# Patient Record
Sex: MALE | Race: BLACK OR AFRICAN AMERICAN | NOT HISPANIC OR LATINO | Employment: FULL TIME | URBAN - METROPOLITAN AREA
[De-identification: names, ages, dates, MRNs, and addresses within clinical notes are randomized per-mention and may not be internally consistent; named-entity substitution may affect disease eponyms.]

---

## 2021-11-24 ENCOUNTER — OFFICE VISIT (OUTPATIENT)
Dept: URGENT CARE | Facility: CLINIC | Age: 31
End: 2021-11-24
Payer: COMMERCIAL

## 2021-11-24 VITALS
RESPIRATION RATE: 18 BRPM | WEIGHT: 200 LBS | HEART RATE: 84 BPM | OXYGEN SATURATION: 98 % | SYSTOLIC BLOOD PRESSURE: 131 MMHG | TEMPERATURE: 98 F | HEIGHT: 69 IN | BODY MASS INDEX: 29.62 KG/M2 | DIASTOLIC BLOOD PRESSURE: 73 MMHG

## 2021-11-24 DIAGNOSIS — H01.001 BLEPHARITIS OF RIGHT UPPER EYELID, UNSPECIFIED TYPE: Primary | ICD-10-CM

## 2021-11-24 PROCEDURE — 99203 PR OFFICE/OUTPT VISIT, NEW, LEVL III, 30-44 MIN: ICD-10-PCS | Mod: S$GLB,,, | Performed by: NURSE PRACTITIONER

## 2021-11-24 PROCEDURE — 99203 OFFICE O/P NEW LOW 30 MIN: CPT | Mod: S$GLB,,, | Performed by: NURSE PRACTITIONER

## 2021-11-24 RX ORDER — POLYMYXIN B SULFATE AND TRIMETHOPRIM 1; 10000 MG/ML; [USP'U]/ML
1 SOLUTION OPHTHALMIC 4 TIMES DAILY
Qty: 1.8667 ML | Refills: 0 | Status: SHIPPED | OUTPATIENT
Start: 2021-11-24 | End: 2021-12-01

## 2021-11-27 ENCOUNTER — TELEPHONE (OUTPATIENT)
Dept: URGENT CARE | Facility: CLINIC | Age: 31
End: 2021-11-27
Payer: COMMERCIAL

## 2022-02-13 ENCOUNTER — OFFICE VISIT (OUTPATIENT)
Dept: URGENT CARE | Facility: CLINIC | Age: 32
End: 2022-02-13
Payer: COMMERCIAL

## 2022-02-13 VITALS
HEART RATE: 78 BPM | OXYGEN SATURATION: 98 % | RESPIRATION RATE: 16 BRPM | WEIGHT: 200 LBS | DIASTOLIC BLOOD PRESSURE: 74 MMHG | SYSTOLIC BLOOD PRESSURE: 136 MMHG | TEMPERATURE: 98 F | HEIGHT: 69 IN | BODY MASS INDEX: 29.62 KG/M2

## 2022-02-13 DIAGNOSIS — H00.011 HORDEOLUM EXTERNUM OF RIGHT UPPER EYELID: ICD-10-CM

## 2022-02-13 DIAGNOSIS — H01.004 BLEPHARITIS OF LEFT UPPER EYELID, UNSPECIFIED TYPE: Primary | ICD-10-CM

## 2022-02-13 PROCEDURE — 3075F SYST BP GE 130 - 139MM HG: CPT | Mod: CPTII,S$GLB,, | Performed by: PHYSICIAN ASSISTANT

## 2022-02-13 PROCEDURE — 3078F DIAST BP <80 MM HG: CPT | Mod: CPTII,S$GLB,, | Performed by: PHYSICIAN ASSISTANT

## 2022-02-13 PROCEDURE — 1159F MED LIST DOCD IN RCRD: CPT | Mod: CPTII,S$GLB,, | Performed by: PHYSICIAN ASSISTANT

## 2022-02-13 PROCEDURE — 3008F PR BODY MASS INDEX (BMI) DOCUMENTED: ICD-10-PCS | Mod: CPTII,S$GLB,, | Performed by: PHYSICIAN ASSISTANT

## 2022-02-13 PROCEDURE — 1160F RVW MEDS BY RX/DR IN RCRD: CPT | Mod: CPTII,S$GLB,, | Performed by: PHYSICIAN ASSISTANT

## 2022-02-13 PROCEDURE — 3078F PR MOST RECENT DIASTOLIC BLOOD PRESSURE < 80 MM HG: ICD-10-PCS | Mod: CPTII,S$GLB,, | Performed by: PHYSICIAN ASSISTANT

## 2022-02-13 PROCEDURE — 1159F PR MEDICATION LIST DOCUMENTED IN MEDICAL RECORD: ICD-10-PCS | Mod: CPTII,S$GLB,, | Performed by: PHYSICIAN ASSISTANT

## 2022-02-13 PROCEDURE — 99213 PR OFFICE/OUTPT VISIT, EST, LEVL III, 20-29 MIN: ICD-10-PCS | Mod: S$GLB,,, | Performed by: PHYSICIAN ASSISTANT

## 2022-02-13 PROCEDURE — 1160F PR REVIEW ALL MEDS BY PRESCRIBER/CLIN PHARMACIST DOCUMENTED: ICD-10-PCS | Mod: CPTII,S$GLB,, | Performed by: PHYSICIAN ASSISTANT

## 2022-02-13 PROCEDURE — 3075F PR MOST RECENT SYSTOLIC BLOOD PRESS GE 130-139MM HG: ICD-10-PCS | Mod: CPTII,S$GLB,, | Performed by: PHYSICIAN ASSISTANT

## 2022-02-13 PROCEDURE — 99213 OFFICE O/P EST LOW 20 MIN: CPT | Mod: S$GLB,,, | Performed by: PHYSICIAN ASSISTANT

## 2022-02-13 PROCEDURE — 3008F BODY MASS INDEX DOCD: CPT | Mod: CPTII,S$GLB,, | Performed by: PHYSICIAN ASSISTANT

## 2022-02-13 RX ORDER — POLYMYXIN B SULFATE AND TRIMETHOPRIM 1; 10000 MG/ML; [USP'U]/ML
1 SOLUTION OPHTHALMIC EVERY 6 HOURS
Qty: 10 ML | Refills: 0 | Status: SHIPPED | OUTPATIENT
Start: 2022-02-13

## 2022-02-13 RX ORDER — SULFAMETHOXAZOLE AND TRIMETHOPRIM 800; 160 MG/1; MG/1
1 TABLET ORAL 2 TIMES DAILY
Qty: 10 TABLET | Refills: 0 | Status: SHIPPED | OUTPATIENT
Start: 2022-02-13 | End: 2022-02-18

## 2022-02-13 NOTE — PROGRESS NOTES
"Subjective:       Patient ID: Abundio Horton is a 31 y.o. male.    Vitals:  height is 5' 9" (1.753 m) and weight is 90.7 kg (200 lb). His temperature is 97.9 °F (36.6 °C). His blood pressure is 136/74 and his pulse is 78. His respiration is 16 and oxygen saturation is 98%.     Chief Complaint: Eye Problem    Patient works in grain industry. Tends to get a lot of yvonne particles in his eyes.    Eye Problem   The left eye is affected. This is a new problem. The current episode started yesterday. The problem occurs constantly. The problem has been gradually worsening. The injury mechanism was a foreign body. The pain is at a severity of 6/10. The pain is moderate. There is no known exposure to pink eye. He does not wear contacts. Associated symptoms include blurred vision, an eye discharge (green, crusty discharge), eye redness and photophobia. Pertinent negatives include no double vision, fever, foreign body sensation, itching, nausea, recent URI or vomiting. Associated symptoms comments: Eye feels sore. He has tried eye drops and water (Baby shampoo (per Derm recommendation), heat compress) for the symptoms. The treatment provided mild relief.       Constitution: Negative for fever.   Eyes: Positive for eye discharge (green, crusty discharge), eye redness, photophobia and blurred vision. Negative for eye itching and double vision.   Gastrointestinal: Negative for nausea and vomiting.       Objective:      Physical Exam   Constitutional: He is oriented to person, place, and time. He appears well-developed and well-nourished.   HENT:   Head: Normocephalic and atraumatic.   Ears:   Right Ear: External ear normal.   Left Ear: External ear normal.   Nose: Nose normal.   Mouth/Throat: Oropharynx is clear and moist.   Eyes: Conjunctivae, EOM and lids are normal. Pupils are equal, round, and reactive to light. Lids are everted and swept, no foreign bodies found. Right eye exhibits hordeolum (Healing but not resolved).      " extraocular movement intact vision grossly intact      Comments: +left upper lid with significant swelling and erythema.  +tender to palpation.     Neck: Trachea normal and phonation normal. Neck supple.   Musculoskeletal: Normal range of motion.         General: Normal range of motion.   Neurological: He is alert and oriented to person, place, and time.   Skin: Skin is warm, dry and intact.   Psychiatric: He has a normal mood and affect. His speech is normal and behavior is normal. Judgment and thought content normal. Cognition and memory  Nursing note and vitals reviewed.        Assessment:       1. Blepharitis of left upper eyelid, unspecified type    2. Hordeolum externum of right upper eyelid          Plan:         Blepharitis of left upper eyelid, unspecified type  -     sulfamethoxazole-trimethoprim 800-160mg (BACTRIM DS) 800-160 mg Tab; Take 1 tablet by mouth 2 (two) times daily. for 5 days  Dispense: 10 tablet; Refill: 0  -     polymyxin B sulf-trimethoprim (POLYTRIM) 10,000 unit- 1 mg/mL Drop; Place 1 drop into the left eye every 6 (six) hours.  Dispense: 10 mL; Refill: 0    Hordeolum externum of right upper eyelid  -     sulfamethoxazole-trimethoprim 800-160mg (BACTRIM DS) 800-160 mg Tab; Take 1 tablet by mouth 2 (two) times daily. for 5 days  Dispense: 10 tablet; Refill: 0  -     polymyxin B sulf-trimethoprim (POLYTRIM) 10,000 unit- 1 mg/mL Drop; Place 1 drop into the left eye every 6 (six) hours.  Dispense: 10 mL; Refill: 0       Bactrim sent to pharmacy due to the tenderness of left eyelid and the presence of right stye still there.    Patient states last visit he got the Polytrim and it helped with pain relief.  Will send a prescription of Polytrim to pharmacy.    Warm compresses to eyelid 2-3 times per day.  Tylenol and/or Ibuprofen for pain.  Follow up with Ophthalmology for further treatment.  ER with new or worsening symptoms.

## 2022-02-13 NOTE — PATIENT INSTRUCTIONS
Patient Education       Blepharitis   The Basics   Written by the doctors and editors at [facility]   What is blepharitis? -- Blepharitis is inflammation of the eyelids that causes redness and swelling of the lids. The symptoms might get better and then come back. But blepharitis rarely causes problems with your vision.  Blepharitis is more common in people who have certain skin conditions, including:  · Rosacea - This causes redness and raised, red bumps on the cheeks, nose, chin, forehead, or eyelids.  · Seborrhea - This causes redness, scaly patches, and itching, mostly on the scalp. Dandruff is a mild form of seborrhea.  What are the symptoms of blepharitis? -- The symptoms include:  · Eyelids that are red, swollen, and itchy  · A gritty or burning feeling in the eyes  · Red eyes  · Crusty, matted eyelashes in the morning  · Flaking or scaling of the eyelid skin (picture 1)  Is there a test for blepharitis? -- No. There is no test. But your doctor or nurse should be able to tell if you have it by learning about your symptoms and doing an exam.  Is there anything I can do on my own to feel better? -- Yes. You can:  · Put warm, wet pressure on your eyes - Wet a clean wash cloth with warm (not scalding hot) water and put it over your eyes. When the wash cloth cools, reheat it with warm water and put it back over your eyes. Repeat these steps for 5 minutes, 2 to 4 times a day.  · Gently rub your eyelids - Do this right after putting warm, wet pressure on your eyes. Use the washcloth or a clean fingertip to gently rub your eyelid in small circles.  · Wash your eyelids - Use plain warm water or warm water with a drop of baby shampoo on a clean washcloth, gauze pad, or cotton swab. Gently clean any crusty material off the eyelashes and eyelids. Do not rub hard or you can cause more irritation. You can also use over-the-counter eyelid scrubs and pads.  How is blepharitis treated? -- If the treatments you do on your own do  not help, your doctor might prescribe:  · An antibiotic cream or ointment to put on your eyelids  · Antibiotic pills  All topics are updated as new evidence becomes available and our peer review process is complete.  This topic retrieved from [employer] on: Sep 21, 2021.  Topic 93806 Version 15.0  Release: 29.4.2 - C29.263  © 2021 UpToDate, Inc. and/or its affiliates. All rights reserved.  picture 1: Blepharitis     Blepharitis is an inflammation of the eyelids that causes redness and swelling. The skin of the eyelids might also scale or flake, as in this picture.  Graphic 66664 Version 1.0    Consumer Information Use and Disclaimer   This information is not specific medical advice and does not replace information you receive from your health care provider. This is only a brief summary of general information. It does NOT include all information about conditions, illnesses, injuries, tests, procedures, treatments, therapies, discharge instructions or life-style choices that may apply to you. You must talk with your health care provider for complete information about your health and treatment options. This information should not be used to decide whether or not to accept your health care provider's advice, instructions or recommendations. Only your health care provider has the knowledge and training to provide advice that is right for you. The use of this information is governed by the [employer] End User License Agreement, available at https://www.[employer].DocASAP/en/solutions/[employer]/about/[place].The use of [employer] content is governed by the [employer] Terms of Use. ©2021 UpToDate, Inc. All rights reserved.  Copyright   © 2021 UpToDate, Inc. and/or its affiliates. All rights reserved.

## 2022-02-16 ENCOUNTER — TELEPHONE (OUTPATIENT)
Dept: URGENT CARE | Facility: CLINIC | Age: 32
End: 2022-02-16
Payer: COMMERCIAL

## 2024-04-06 ENCOUNTER — OFFICE VISIT (OUTPATIENT)
Dept: URGENT CARE | Facility: CLINIC | Age: 34
End: 2024-04-06
Payer: COMMERCIAL

## 2024-04-06 VITALS
OXYGEN SATURATION: 96 % | RESPIRATION RATE: 31 BRPM | DIASTOLIC BLOOD PRESSURE: 70 MMHG | HEART RATE: 124 BPM | SYSTOLIC BLOOD PRESSURE: 136 MMHG | WEIGHT: 205 LBS | BODY MASS INDEX: 30.36 KG/M2 | HEIGHT: 69 IN | TEMPERATURE: 103 F

## 2024-04-06 DIAGNOSIS — B34.9 VIRAL SYNDROME: Primary | ICD-10-CM

## 2024-04-06 DIAGNOSIS — R52 BODY ACHES: ICD-10-CM

## 2024-04-06 DIAGNOSIS — R50.9 FEVER, UNSPECIFIED FEVER CAUSE: ICD-10-CM

## 2024-04-06 LAB
CTP QC/QA: YES
POC MOLECULAR INFLUENZA A AGN: NEGATIVE
POC MOLECULAR INFLUENZA B AGN: NEGATIVE

## 2024-04-06 PROCEDURE — 99213 OFFICE O/P EST LOW 20 MIN: CPT | Mod: S$GLB,,, | Performed by: PHYSICIAN ASSISTANT

## 2024-04-06 PROCEDURE — 87502 INFLUENZA DNA AMP PROBE: CPT | Mod: QW,S$GLB,, | Performed by: PHYSICIAN ASSISTANT

## 2024-04-06 RX ORDER — IBUPROFEN 200 MG
800 TABLET ORAL
Status: COMPLETED | OUTPATIENT
Start: 2024-04-06 | End: 2024-04-06

## 2024-04-06 RX ADMIN — Medication 800 MG: at 01:04

## 2024-04-06 NOTE — LETTER
April 6, 2024      [facility]  [address], [address]  [place] [address]  Phone: [phone]       Patient: Abundio Hroton   YOB: 1990  Date of Visit: 04/06/2024    To Whom It May Concern:    Alejandro Horton  was at [facility] on 04/06/2024. The patient may return to work/school on 4/9/24 with no restrictions. If you have any questions or concerns, or if I can be of further assistance, please do not hesitate to contact me.    Sincerely,      Anne Hampton PA-C

## 2024-04-06 NOTE — PROGRESS NOTES
"Subjective:      Patient ID: Abundio Horton is a 33 y.o. male.    Vitals:  height is 5' 9" (1.753 m) and weight is 93 kg (205 lb). His oral temperature is 102.8 °F (39.3 °C) (abnormal). His blood pressure is 136/70 and his pulse is 124 (abnormal). His respiration is 31 (abnormal) and oxygen saturation is 96%.     Chief Complaint: Headache      33 year old patient presents here today with c/o of h/a,chills,body achesx 1 day . Pt denies any OTC medications taken for sxs. Pt denies any n/v/d/cp/.  Pt states his headache is mostly in the front of his head. Pt denies sick contacts. Pt states he feels as if he has been having a fever due to warm to touch.       Headache   This is a new problem. The current episode started in the past 7 days. The problem occurs constantly. The problem has been unchanged. The pain is located in the Frontal region. The pain does not radiate. The pain quality is not similar to prior headaches. The quality of the pain is described as aching. The pain is at a severity of 3/10. Associated symptoms include a fever and muscle aches. Pertinent negatives include no abdominal pain, abnormal behavior, anorexia, back pain, blurred vision, coughing, dizziness, drainage, ear pain, eye pain, eye redness, eye watering, facial sweating, hearing loss, insomnia, loss of balance, nausea, neck pain, numbness, scalp tenderness, sore throat, swollen glands, tingling, visual change, vomiting or weakness. Nothing aggravates the symptoms. He has tried nothing for the symptoms. The treatment provided no relief. There is no history of migraine headaches.       Constitution: Positive for fever.   HENT:  Negative for ear pain, hearing loss and sore throat.    Neck: Negative for neck pain.   Eyes:  Negative for eye pain, eye redness and blurred vision.   Respiratory:  Negative for cough.    Gastrointestinal:  Negative for abdominal pain, nausea and vomiting.   Musculoskeletal:  Negative for back pain.   Neurological:  " Positive for headaches. Negative for dizziness, loss of balance, history of migraines and numbness.   Psychiatric/Behavioral:  The patient does not have insomnia.       Objective:     Physical Exam   Constitutional: He is oriented to person, place, and time. He appears well-developed.   HENT:   Head: Normocephalic and atraumatic.   Ears:   Right Ear: External ear normal.   Left Ear: External ear normal.   Nose: Nose normal.   Mouth/Throat: Oropharynx is clear and moist.   Eyes: Conjunctivae, EOM and lids are normal.   Neck: Trachea normal and phonation normal. Neck supple.   Cardiovascular: Tachycardia present.   Pulmonary/Chest: Effort normal. No respiratory distress. He has no wheezes. He has no rhonchi.   Musculoskeletal: Normal range of motion.         General: Normal range of motion.   Neurological: He is alert and oriented to person, place, and time.   Skin: Skin is warm, dry and intact.   Psychiatric: His speech is normal and behavior is normal. Judgment and thought content normal.   Nursing note and vitals reviewed.      Assessment:     1. Viral syndrome    2. Fever, unspecified fever cause    3. Body aches      Results for orders placed or performed in visit on 04/06/24   POCT Influenza A/B MOLECULAR   Result Value Ref Range    POC Molecular Influenza A Ag Negative Negative, Not Reported    POC Molecular Influenza B Ag Negative Negative, Not Reported     Acceptable Yes        Plan:   VSS. Patient non-toxic appearing. Discussed medication being prescribed.  Advised patient to follow up with PCP as needed.  Patient verbalized understanding, agrees with the plan, and is comfortable with discharge.      Viral syndrome    Fever, unspecified fever cause  -     POCT Influenza A/B MOLECULAR    Body aches    Other orders  -     ibuprofen tablet 800 mg      Medical Decision Making:   Clinical Tests:   Lab Tests: Ordered and Reviewed       <> Summary of Lab: Flu negative